# Patient Record
Sex: FEMALE | Race: WHITE | NOT HISPANIC OR LATINO | URBAN - METROPOLITAN AREA
[De-identification: names, ages, dates, MRNs, and addresses within clinical notes are randomized per-mention and may not be internally consistent; named-entity substitution may affect disease eponyms.]

---

## 2018-12-02 ENCOUNTER — EMERGENCY (EMERGENCY)
Facility: HOSPITAL | Age: 83
LOS: 0 days | Discharge: HOME | End: 2018-12-02
Attending: EMERGENCY MEDICINE | Admitting: EMERGENCY MEDICINE

## 2018-12-02 VITALS
TEMPERATURE: 97 F | RESPIRATION RATE: 18 BRPM | DIASTOLIC BLOOD PRESSURE: 92 MMHG | WEIGHT: 179.9 LBS | OXYGEN SATURATION: 99 % | HEART RATE: 60 BPM | HEIGHT: 64 IN | SYSTOLIC BLOOD PRESSURE: 175 MMHG

## 2018-12-02 VITALS
OXYGEN SATURATION: 95 % | DIASTOLIC BLOOD PRESSURE: 70 MMHG | SYSTOLIC BLOOD PRESSURE: 136 MMHG | HEART RATE: 70 BPM | RESPIRATION RATE: 18 BRPM

## 2018-12-02 DIAGNOSIS — S09.90XA UNSPECIFIED INJURY OF HEAD, INITIAL ENCOUNTER: ICD-10-CM

## 2018-12-02 DIAGNOSIS — S70.11XA CONTUSION OF RIGHT THIGH, INITIAL ENCOUNTER: ICD-10-CM

## 2018-12-02 DIAGNOSIS — Z23 ENCOUNTER FOR IMMUNIZATION: ICD-10-CM

## 2018-12-02 DIAGNOSIS — S00.83XA CONTUSION OF OTHER PART OF HEAD, INITIAL ENCOUNTER: ICD-10-CM
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
## 2018-12-02 DIAGNOSIS — S80.02XA CONTUSION OF LEFT KNEE, INITIAL ENCOUNTER: ICD-10-CM
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
## 2018-12-02 DIAGNOSIS — M54.2 CERVICALGIA: ICD-10-CM

## 2018-12-02 DIAGNOSIS — H11.31 CONJUNCTIVAL HEMORRHAGE, RIGHT EYE: ICD-10-CM

## 2018-12-02 DIAGNOSIS — S40.021A CONTUSION OF RIGHT UPPER ARM, INITIAL ENCOUNTER: ICD-10-CM

## 2018-12-02 RX ORDER — TETANUS TOXOID, REDUCED DIPHTHERIA TOXOID AND ACELLULAR PERTUSSIS VACCINE, ADSORBED 5; 2.5; 8; 8; 2.5 [IU]/.5ML; [IU]/.5ML; UG/.5ML; UG/.5ML; UG/.5ML
0.5 SUSPENSION INTRAMUSCULAR ONCE
Qty: 0 | Refills: 0 | Status: COMPLETED | OUTPATIENT
Start: 2018-12-02 | End: 2018-12-02

## 2018-12-02 RX ORDER — ACETAMINOPHEN 500 MG
975 TABLET ORAL ONCE
Qty: 0 | Refills: 0 | Status: COMPLETED | OUTPATIENT
Start: 2018-12-02 | End: 2018-12-02

## 2018-12-02 RX ADMIN — Medication 975 MILLIGRAM(S): at 11:01

## 2018-12-02 RX ADMIN — TETANUS TOXOID, REDUCED DIPHTHERIA TOXOID AND ACELLULAR PERTUSSIS VACCINE, ADSORBED 0.5 MILLILITER(S): 5; 2.5; 8; 8; 2.5 SUSPENSION INTRAMUSCULAR at 11:01

## 2018-12-02 NOTE — ED PROVIDER NOTE - ATTENDING CONTRIBUTION TO CARE
I personally evaluated the patient. I reviewed the Resident’s or Physician Assistant’s note (as assigned above), and agree with the findings and plan except as documented in my note. 87yo F BIBA from home after a mechanical fall this morning. Pt is on Plavix. No LOC, HA, nausea, neck pain, palpitations, Vomiting, visual changes, CP, SOB, abdominal pain, back pain, (+) RUE pain but denies any other extremity pain. Pt with knee replacement on the right. Pt denies any hip pain.  She denies any posterior cervical neck pain.  On exam: NCAT. PERRLA, EOMI. OP clear. Lungs CTAB. RRR, S1S2 noted. Radial pulses 2+ and equal, pedal pulses 2+ and equal. Abdomen soft, NT/ND, no rebound or guarding. FROM x4 extremities. No focal neuro deficits.  Right eye with subconjunctival hemorrhage, Bruising to the right arm and right face. VA intact, Plan: CT head orbits, neck, XR of the RUE and pelvis, continue to monitor.

## 2018-12-02 NOTE — ED PROVIDER NOTE - MEDICAL DECISION MAKING DETAILS
I personally evaluated the patient. I reviewed the Resident’s or Physician Assistant’s note (as assigned above), and agree with the findings and plan except as documented in my note. 87yo F BIBA from home after a mechanical fall this morning. Pt is on Plavix. No LOC, HA, nausea, neck pain, palpitations, Vomiting, visual changes, CP, SOB, abdominal pain, back pain, (+) RUE pain but denies any other extremity pain. Pt with knee replacement on the right. Pt denies any hip pain. On exam: NCAT. PERRLA, EOMI. OP clear. Lungs CTAB. RRR, S1S2 noted. Radial pulses 2+ and equal, pedal pulses 2+ and equal. Abdomen soft, NT/ND, no rebound or guarding. FROM x4 extremities. No focal neuro deficits.  Right eye with subconjunctival hemorrhage, Bruising to the right arm and right face. VA intact, Plan: CT head orbits, neck, XR of the RUE and pelvis, continue to monitor. I personally evaluated the patient. I reviewed the Resident’s or Physician Assistant’s note (as assigned above), and agree with the findings and plan except as documented in my note. 85yo F BIBA from home after a mechanical fall this morning. Pt is on Plavix. No LOC, HA, nausea, neck pain, palpitations, Vomiting, visual changes, CP, SOB, abdominal pain, back pain, (+) RUE pain but denies any other extremity pain. Pt with knee replacement on the right. Pt denies any hip pain.  She denies any posterior cervical neck pain.  On exam: NCAT. PERRLA, EOMI. OP clear. Lungs CTAB. RRR, S1S2 noted. Radial pulses 2+ and equal, pedal pulses 2+ and equal. Abdomen soft, NT/ND, no rebound or guarding. FROM x4 extremities. No focal neuro deficits.  Right eye with subconjunctival hemorrhage, Bruising to the right arm and right face. VA intact, Plan: CT head orbits, neck, XR of the RUE and pelvis, continue to monitor.

## 2018-12-02 NOTE — ED PROVIDER NOTE - OBJECTIVE STATEMENT
patient Moroccan speaking, daughter as translater  87 yo F here s/p fall. Patient was coming down stairs, missed a step and fell 1 step and fell  injuring face, neck, right arm, right leg and left knee. +bleeding to face. No loc, HA, n/v or change in mental status/vison. No chest pains abdominal pains. Last tetanus unknown.

## 2018-12-02 NOTE — ED ADULT NURSE REASSESSMENT NOTE - NS ED NURSE REASSESS COMMENT FT1
Pt does not know the names of all her medications and does not the reasons that she takes the medications

## 2018-12-02 NOTE — ED PROVIDER NOTE - PROGRESS NOTE DETAILS
Patient given copies of results for outpatient f/u. Patient able to ambulate without pain at baseline discharged with family

## 2018-12-02 NOTE — ED ADULT TRIAGE NOTE - CHIEF COMPLAINT QUOTE
Pt fell down 1 step and landed face down on a tile floor. No LOC Pt has swelling to R eyeand abrasion to R side of nose

## 2018-12-02 NOTE — ED PROVIDER NOTE - NSFOLLOWUPINSTRUCTIONS_ED_ALL_ED_FT
Follow up with your PMD and opthalmology in 2-3 days.     Patient to be discharged from ED. Any available test results were discussed with patient and/or family. Verbal instructions given, including instructions to return to ED immediately for any new, worsening, or concerning symptoms. Patient endorsed understanding. Written discharge instructions additionally given, including follow-up plan.

## 2018-12-02 NOTE — ED PROVIDER NOTE - CARE PROVIDER_API CALL
your PMD 2 days,   Phone: (   )    -  Fax: (   )    -    Joby Adorno (MD), Ophthalmology  [facility] [place] [address]  Phone: [phone]  Fax: [phone]

## 2018-12-02 NOTE — ED PROVIDER NOTE - NS ED ROS FT
Review of Systems    Constitutional: (-) fever  Eyes/ENT: (-) blurry vision, (-) epistaxis  Cardiovascular: (-) chest pain, (-) syncope  Respiratory: (-) cough, (-) shortness of breath  Gastrointestinal: (-) vomiting, (-) diarrhea  Musculoskeletal: (+) neck pain, (-) back pain, (+) right face pain, (+) pain to extremities  Integumentary: (-) rash, (-) edema, (+) bleeding to face  Neurological: (-) headache, (-) altered mental status  Psychiatric: (-) hallucinations  Allergic/Immunologic: (-) pruritus

## 2018-12-02 NOTE — ED PROVIDER NOTE - CARE PLAN
Principal Discharge DX:	Head injury  Secondary Diagnosis:	Subconjunctival hemorrhage of right eye  Secondary Diagnosis:	Contusion

## 2018-12-02 NOTE — ED PROVIDER NOTE - PHYSICAL EXAMINATION
Gen: Alert, NAD, well appearing  Head: NC, +swelling right periorbital area.  right eye: pupil irregular (hx of prior sx). +subconjunctival hemorrhage. Fluorescin stain negative for abrasion/Stew sign. EOMI. able to count fingers 5 feet away. Left eye: WNL   ENT: normal hearing, patent oropharynx  Neck: +supple, +tenderness to right side of neck  Pulm: Bilateral BS, normal resp effort, no wheeze/stridor/retractions  CV: S1S2, RRR  Abd: soft, NT/ND  Mskel: +tender to palpation over right upper arm, right leg, and left knee. FROM x4. No back tenderness. n/v intact. No edema/erythema/cyanosis  Skin: +abrasion to right cheek area. Ecchymosis to right face, right upper arm, right thigh and left leg. No rash, warm/dry.   Neuro: AAOx3, no sensory/motor deficits Gen: Alert, NAD, well appearing  Head: NC, +swelling right periorbital area.  right eye: pupil irregular (hx of prior sx). +subconjunctival hemorrhage. Fluorescin stain negative for abrasion/Stew sign. EOMI. able to count fingers 5 feet away. Left eye: WNL   ENT: normal hearing, patent oropharynx  Neck: +supple, +tenderness to right side of neck  Pulm: Bilateral BS, normal resp effort, no wheeze/stridor/retractions  CV: S1S2, RRR  Abd: soft, NT/ND  Mskel: +tender to palpation over right upper arm, right leg, and left knee. FROM x4. No back tenderness. n/v intact. No edema/erythema/cyanosis  Skin: +abrasion to right cheek area. Ecchymosis to right face, right upper arm, right thigh, right shin and left knee. No rash, warm/dry.   Neuro: AAOx3, no sensory/motor deficits Gen: Alert, NAD, well appearing  Head: NC, +swelling right periorbital area.  right eye: pupil irregular (hx of prior sx). +subconjunctival hemorrhage. Fluorescin stain negative for abrasion/Stew sign. EOMI. able to count fingers 5 feet away. Unable to visual accuity with eye chart since does not know alphabet.  Left eye: WNL   ENT: normal hearing, patent oropharynx  Neck: +supple, +tenderness to right side of neck  Pulm: Bilateral BS, normal resp effort, no wheeze/stridor/retractions  CV: S1S2, RRR  Abd: soft, NT/ND  Mskel: +tender to palpation over right upper arm, right leg, and left knee. FROM x4. No back tenderness. n/v intact. No edema/erythema/cyanosis  Skin: +abrasion to right cheek area. Ecchymosis to right face, right upper arm, right thigh, right shin and left knee. No rash, warm/dry.   Neuro: AAOx3, no sensory/motor deficits

## 2019-07-22 PROBLEM — I10 ESSENTIAL (PRIMARY) HYPERTENSION: Chronic | Status: ACTIVE | Noted: 2018-12-02

## 2019-07-23 PROBLEM — Z00.00 ENCOUNTER FOR PREVENTIVE HEALTH EXAMINATION: Status: ACTIVE | Noted: 2019-07-23

## 2019-08-26 ENCOUNTER — APPOINTMENT (OUTPATIENT)
Dept: NEUROSURGERY | Facility: CLINIC | Age: 84
End: 2019-08-26
Payer: MEDICARE

## 2019-08-26 VITALS — WEIGHT: 182 LBS | BODY MASS INDEX: 29.25 KG/M2 | HEIGHT: 66 IN

## 2019-08-26 DIAGNOSIS — Z78.9 OTHER SPECIFIED HEALTH STATUS: ICD-10-CM

## 2019-08-26 DIAGNOSIS — I10 ESSENTIAL (PRIMARY) HYPERTENSION: ICD-10-CM

## 2019-08-26 PROCEDURE — 99204 OFFICE O/P NEW MOD 45 MIN: CPT

## 2019-08-26 RX ORDER — LOSARTAN POTASSIUM 50 MG/1
50 TABLET, FILM COATED ORAL
Refills: 0 | Status: ACTIVE | COMMUNITY

## 2019-08-26 RX ORDER — OMEGA-3/DHA/EPA/FISH OIL 300-1000MG
CAPSULE ORAL
Refills: 0 | Status: ACTIVE | COMMUNITY

## 2019-08-26 RX ORDER — ISOSORBIDE DINITRATE 30 MG/1
30 TABLET ORAL
Refills: 0 | Status: ACTIVE | COMMUNITY

## 2019-08-26 RX ORDER — CLONIDINE HYDROCHLORIDE 0.1 MG/1
0.1 TABLET ORAL
Refills: 0 | Status: ACTIVE | COMMUNITY

## 2019-08-26 RX ORDER — CARVEDILOL 25 MG/1
25 TABLET, FILM COATED ORAL
Refills: 0 | Status: ACTIVE | COMMUNITY

## 2019-08-26 RX ORDER — GABAPENTIN 600 MG/1
600 TABLET, COATED ORAL
Refills: 0 | Status: ACTIVE | COMMUNITY

## 2019-08-26 RX ORDER — ATORVASTATIN CALCIUM 80 MG/1
TABLET, FILM COATED ORAL
Refills: 0 | Status: ACTIVE | COMMUNITY

## 2019-08-28 ENCOUNTER — FORM ENCOUNTER (OUTPATIENT)
Age: 84
End: 2019-08-28

## 2019-08-29 ENCOUNTER — OUTPATIENT (OUTPATIENT)
Dept: OUTPATIENT SERVICES | Facility: HOSPITAL | Age: 84
LOS: 1 days | Discharge: HOME | End: 2019-08-29
Payer: MEDICARE

## 2019-08-29 VITALS
OXYGEN SATURATION: 96 % | HEIGHT: 63 IN | SYSTOLIC BLOOD PRESSURE: 140 MMHG | HEART RATE: 60 BPM | RESPIRATION RATE: 17 BRPM | DIASTOLIC BLOOD PRESSURE: 60 MMHG | TEMPERATURE: 96 F

## 2019-08-29 DIAGNOSIS — Z98.890 OTHER SPECIFIED POSTPROCEDURAL STATES: Chronic | ICD-10-CM

## 2019-08-29 DIAGNOSIS — Z01.818 ENCOUNTER FOR OTHER PREPROCEDURAL EXAMINATION: ICD-10-CM

## 2019-08-29 DIAGNOSIS — M54.16 RADICULOPATHY, LUMBAR REGION: ICD-10-CM

## 2019-08-29 LAB
ALBUMIN SERPL ELPH-MCNC: 4.4 G/DL — SIGNIFICANT CHANGE UP (ref 3.5–5.2)
ALP SERPL-CCNC: 47 U/L — SIGNIFICANT CHANGE UP (ref 30–115)
ALT FLD-CCNC: 13 U/L — SIGNIFICANT CHANGE UP (ref 0–41)
ANION GAP SERPL CALC-SCNC: 11 MMOL/L — SIGNIFICANT CHANGE UP (ref 7–14)
APPEARANCE UR: CLEAR — SIGNIFICANT CHANGE UP
APTT BLD: 32.8 SEC — SIGNIFICANT CHANGE UP (ref 27–39.2)
AST SERPL-CCNC: 24 U/L — SIGNIFICANT CHANGE UP (ref 0–41)
BASOPHILS # BLD AUTO: 0.01 K/UL — SIGNIFICANT CHANGE UP (ref 0–0.2)
BASOPHILS NFR BLD AUTO: 0.2 % — SIGNIFICANT CHANGE UP (ref 0–1)
BILIRUB SERPL-MCNC: 0.7 MG/DL — SIGNIFICANT CHANGE UP (ref 0.2–1.2)
BILIRUB UR-MCNC: NEGATIVE — SIGNIFICANT CHANGE UP
BUN SERPL-MCNC: 30 MG/DL — HIGH (ref 10–20)
CALCIUM SERPL-MCNC: 9.5 MG/DL — SIGNIFICANT CHANGE UP (ref 8.5–10.1)
CHLORIDE SERPL-SCNC: 104 MMOL/L — SIGNIFICANT CHANGE UP (ref 98–110)
CO2 SERPL-SCNC: 29 MMOL/L — SIGNIFICANT CHANGE UP (ref 17–32)
COLOR SPEC: YELLOW — SIGNIFICANT CHANGE UP
CREAT SERPL-MCNC: 1 MG/DL — SIGNIFICANT CHANGE UP (ref 0.7–1.5)
DIFF PNL FLD: NEGATIVE — SIGNIFICANT CHANGE UP
EOSINOPHIL # BLD AUTO: 0.02 K/UL — SIGNIFICANT CHANGE UP (ref 0–0.7)
EOSINOPHIL NFR BLD AUTO: 0.4 % — SIGNIFICANT CHANGE UP (ref 0–8)
GLUCOSE SERPL-MCNC: 200 MG/DL — HIGH (ref 70–99)
GLUCOSE UR QL: NEGATIVE — SIGNIFICANT CHANGE UP
HCT VFR BLD CALC: 31.9 % — LOW (ref 37–47)
HGB BLD-MCNC: 10.2 G/DL — LOW (ref 12–16)
IMM GRANULOCYTES NFR BLD AUTO: 0.2 % — SIGNIFICANT CHANGE UP (ref 0.1–0.3)
INR BLD: 0.98 RATIO — SIGNIFICANT CHANGE UP (ref 0.65–1.3)
KETONES UR-MCNC: NEGATIVE — SIGNIFICANT CHANGE UP
LEUKOCYTE ESTERASE UR-ACNC: NEGATIVE — SIGNIFICANT CHANGE UP
LYMPHOCYTES # BLD AUTO: 1.32 K/UL — SIGNIFICANT CHANGE UP (ref 1.2–3.4)
LYMPHOCYTES # BLD AUTO: 27.6 % — SIGNIFICANT CHANGE UP (ref 20.5–51.1)
MCHC RBC-ENTMCNC: 28.7 PG — SIGNIFICANT CHANGE UP (ref 27–31)
MCHC RBC-ENTMCNC: 32 G/DL — SIGNIFICANT CHANGE UP (ref 32–37)
MCV RBC AUTO: 89.6 FL — SIGNIFICANT CHANGE UP (ref 81–99)
MONOCYTES # BLD AUTO: 0.37 K/UL — SIGNIFICANT CHANGE UP (ref 0.1–0.6)
MONOCYTES NFR BLD AUTO: 7.7 % — SIGNIFICANT CHANGE UP (ref 1.7–9.3)
NEUTROPHILS # BLD AUTO: 3.05 K/UL — SIGNIFICANT CHANGE UP (ref 1.4–6.5)
NEUTROPHILS NFR BLD AUTO: 63.9 % — SIGNIFICANT CHANGE UP (ref 42.2–75.2)
NITRITE UR-MCNC: NEGATIVE — SIGNIFICANT CHANGE UP
NRBC # BLD: 0 /100 WBCS — SIGNIFICANT CHANGE UP (ref 0–0)
PH UR: 6 — SIGNIFICANT CHANGE UP (ref 5–8)
PLATELET # BLD AUTO: 137 K/UL — SIGNIFICANT CHANGE UP (ref 130–400)
POTASSIUM SERPL-MCNC: 4.2 MMOL/L — SIGNIFICANT CHANGE UP (ref 3.5–5)
POTASSIUM SERPL-SCNC: 4.2 MMOL/L — SIGNIFICANT CHANGE UP (ref 3.5–5)
PROT SERPL-MCNC: 6.1 G/DL — SIGNIFICANT CHANGE UP (ref 6–8)
PROT UR-MCNC: SIGNIFICANT CHANGE UP
PROTHROM AB SERPL-ACNC: 11.3 SEC — SIGNIFICANT CHANGE UP (ref 9.95–12.87)
RBC # BLD: 3.56 M/UL — LOW (ref 4.2–5.4)
RBC # FLD: 12.9 % — SIGNIFICANT CHANGE UP (ref 11.5–14.5)
SODIUM SERPL-SCNC: 144 MMOL/L — SIGNIFICANT CHANGE UP (ref 135–146)
SP GR SPEC: 1.03 — HIGH (ref 1.01–1.02)
UROBILINOGEN FLD QL: SIGNIFICANT CHANGE UP
WBC # BLD: 4.78 K/UL — LOW (ref 4.8–10.8)
WBC # FLD AUTO: 4.78 K/UL — LOW (ref 4.8–10.8)

## 2019-08-29 PROCEDURE — 93010 ELECTROCARDIOGRAM REPORT: CPT

## 2019-08-29 PROCEDURE — 71046 X-RAY EXAM CHEST 2 VIEWS: CPT | Mod: 26

## 2019-08-29 NOTE — H&P PST ADULT - HISTORY OF PRESENT ILLNESS
86 year old female here for above, pt states previous history of lumbar spine surgery, was doing well for several years, approx 6 months ago + low back pain with pain down left leg. Pt tried PT, pain management, and also injection without relief, pt had trial of spinal cord stimulator with good effect, now to have permanent placement  pt denies cp, sob, redd or palpitations  pt denies urinary frequency, urgency or burning  ex stephen does not do stairs lives in elevator apartment building

## 2019-08-29 NOTE — H&P PST ADULT - EXTREMITIES COMMENTS
BLE, feet up to calves, pt takes diuretic (furosemide) only twice per week, advised to contact PMD. NP called MD office and sent note

## 2019-08-29 NOTE — H&P PST ADULT - LANGUAGE ASSISTANCE NEEDED
No-Patient/Caregiver offered and refused free interpretation services. No-Patient/Caregiver offered and refused free interpretation services./had daughter on phone and also home attendant

## 2019-08-29 NOTE — H&P PST ADULT - PRO PAIN LIFE ADAPT
decreased activity level/inability to enjoy life/withdrawal from social contact/inability or reluctance to perform ADLs

## 2019-09-05 RX ORDER — CLOPIDOGREL BISULFATE 75 MG/1
0 TABLET, FILM COATED ORAL
Qty: 0 | Refills: 0 | DISCHARGE

## 2019-09-09 NOTE — HISTORY OF PRESENT ILLNESS
[de-identified] : This is a justine 87 yo F with h/o severe degenerative scoliosis and lumbar fusion who underwent trial of SCS with pain management after failing extensive conservative treatment including PT, injections. She cont to have pain in the back radiating into the legs. She had more than 65% relief of both back and leg pain with here SCS trial. She would like to proceed with permanent implantation.

## 2019-09-09 NOTE — PLAN
[FreeTextEntry1] : I discussed at length with her the risk, benefit and alternative of permanent percutaneous SCS placement, including but not limited to bleeding, infection, spinal cord injury/nerve damage, CSF leak, lead migration, hardware failure, lack of pain relief, need for further procedures. All questions were answered and she wishes to proceed. Recent RI to be obtained for further review.

## 2019-09-20 ENCOUNTER — OUTPATIENT (OUTPATIENT)
Dept: OUTPATIENT SERVICES | Facility: HOSPITAL | Age: 84
LOS: 1 days | Discharge: HOME | End: 2019-09-20
Payer: MEDICARE

## 2019-09-20 VITALS
SYSTOLIC BLOOD PRESSURE: 124 MMHG | RESPIRATION RATE: 18 BRPM | DIASTOLIC BLOOD PRESSURE: 71 MMHG | HEIGHT: 63 IN | TEMPERATURE: 98 F | WEIGHT: 179.9 LBS | HEART RATE: 68 BPM

## 2019-09-20 VITALS
OXYGEN SATURATION: 98 % | HEART RATE: 80 BPM | DIASTOLIC BLOOD PRESSURE: 70 MMHG | SYSTOLIC BLOOD PRESSURE: 126 MMHG | RESPIRATION RATE: 15 BRPM

## 2019-09-20 DIAGNOSIS — Z98.890 OTHER SPECIFIED POSTPROCEDURAL STATES: Chronic | ICD-10-CM

## 2019-09-20 PROCEDURE — 63650 IMPLANT NEUROELECTRODES: CPT

## 2019-09-20 PROCEDURE — 63685 INS/RPLC SPI NPG/RCVR POCKET: CPT | Mod: AS

## 2019-09-20 PROCEDURE — 63685 INS/RPLC SPI NPG/RCVR POCKET: CPT

## 2019-09-20 RX ORDER — HYDROMORPHONE HYDROCHLORIDE 2 MG/ML
0.5 INJECTION INTRAMUSCULAR; INTRAVENOUS; SUBCUTANEOUS
Refills: 0 | Status: DISCONTINUED | OUTPATIENT
Start: 2019-09-20 | End: 2019-09-20

## 2019-09-20 RX ORDER — CARVEDILOL PHOSPHATE 80 MG/1
1 CAPSULE, EXTENDED RELEASE ORAL
Qty: 0 | Refills: 0 | DISCHARGE

## 2019-09-20 RX ORDER — TRAMADOL HYDROCHLORIDE 50 MG/1
0.5 TABLET ORAL
Qty: 14 | Refills: 0
Start: 2019-09-20

## 2019-09-20 RX ORDER — SODIUM CHLORIDE 9 MG/ML
1000 INJECTION, SOLUTION INTRAVENOUS
Refills: 0 | Status: DISCONTINUED | OUTPATIENT
Start: 2019-09-20 | End: 2019-09-20

## 2019-09-20 RX ORDER — HYDROMORPHONE HYDROCHLORIDE 2 MG/ML
0.25 INJECTION INTRAMUSCULAR; INTRAVENOUS; SUBCUTANEOUS
Refills: 0 | Status: DISCONTINUED | OUTPATIENT
Start: 2019-09-20 | End: 2019-09-20

## 2019-09-20 RX ORDER — OXYCODONE AND ACETAMINOPHEN 5; 325 MG/1; MG/1
2 TABLET ORAL ONCE
Refills: 0 | Status: DISCONTINUED | OUTPATIENT
Start: 2019-09-20 | End: 2019-09-20

## 2019-09-20 RX ORDER — ONDANSETRON 8 MG/1
4 TABLET, FILM COATED ORAL ONCE
Refills: 0 | Status: DISCONTINUED | OUTPATIENT
Start: 2019-09-20 | End: 2019-09-20

## 2019-09-20 RX ADMIN — HYDROMORPHONE HYDROCHLORIDE 0.5 MILLIGRAM(S): 2 INJECTION INTRAMUSCULAR; INTRAVENOUS; SUBCUTANEOUS at 11:15

## 2019-09-20 RX ADMIN — HYDROMORPHONE HYDROCHLORIDE 0.5 MILLIGRAM(S): 2 INJECTION INTRAMUSCULAR; INTRAVENOUS; SUBCUTANEOUS at 12:21

## 2019-09-20 RX ADMIN — HYDROMORPHONE HYDROCHLORIDE 0.5 MILLIGRAM(S): 2 INJECTION INTRAMUSCULAR; INTRAVENOUS; SUBCUTANEOUS at 13:30

## 2019-09-20 RX ADMIN — HYDROMORPHONE HYDROCHLORIDE 0.5 MILLIGRAM(S): 2 INJECTION INTRAMUSCULAR; INTRAVENOUS; SUBCUTANEOUS at 11:37

## 2019-09-20 RX ADMIN — HYDROMORPHONE HYDROCHLORIDE 0.5 MILLIGRAM(S): 2 INJECTION INTRAMUSCULAR; INTRAVENOUS; SUBCUTANEOUS at 12:06

## 2019-09-20 RX ADMIN — HYDROMORPHONE HYDROCHLORIDE 0.5 MILLIGRAM(S): 2 INJECTION INTRAMUSCULAR; INTRAVENOUS; SUBCUTANEOUS at 13:55

## 2019-09-20 RX ADMIN — SODIUM CHLORIDE 100 MILLILITER(S): 9 INJECTION, SOLUTION INTRAVENOUS at 10:37

## 2019-09-20 RX ADMIN — HYDROMORPHONE HYDROCHLORIDE 0.5 MILLIGRAM(S): 2 INJECTION INTRAMUSCULAR; INTRAVENOUS; SUBCUTANEOUS at 15:50

## 2019-09-20 NOTE — ASU DISCHARGE PLAN (ADULT/PEDIATRIC) - CARE PROVIDER_API CALL
Uriel Larios)  [facility]  [address] [place] [address], [address]  [place] [address]  Phone: [phone]  Fax: [phone]  Follow Up Time:

## 2019-09-20 NOTE — ASU DISCHARGE PLAN (ADULT/PEDIATRIC) - CALL YOUR DOCTOR IF YOU HAVE ANY OF THE FOLLOWING:
Bleeding that does not stop/Fever greater than (need to indicate Fahrenheit or Celsius)/Wound/Surgical Site with redness, or foul smelling discharge or pus/Swelling that gets worse/Pain not relieved by Medications

## 2019-09-20 NOTE — CHART NOTE - NSCHARTNOTEFT_GEN_A_CORE
PACU ANESTHESIA ADMISSION NOTE      Procedure:   Post op diagnosis:      ____  Intubated  TV:______       Rate: ______      FiO2: ______    _x___  Patent Airway    _x___  Full return of protective reflexes    __x__  Full recovery from anesthesia / back to baseline     Vitals:   T: 97.7          R: 10                 BP: 157/92                 Sat: 100                  P: 80      Mental Status:  _x___ Awake   _x____ Alert   _____ Drowsy   _____ Sedated    Nausea/Vomiting:  __x__ NO  ______Yes,   See Post - Op Orders          Pain Scale (0-10):  _0____    Treatment: ____ None    __x__ See Post - Op/PCA Orders    Post - Operative Fluids:   ____ Oral   _x___ See Post - Op Orders    Plan: Discharge:   x____Home       _____Floor     _____Critical Care    _____  Other:__alert and awake moving all four extremities _______________    Comments:

## 2019-09-20 NOTE — BRIEF OPERATIVE NOTE - OPERATION/FINDINGS
Percutaneous electrode placed to bottom of T8, high degree difficulty of placement due scoliosis stenosis

## 2019-10-01 DIAGNOSIS — M41.9 SCOLIOSIS, UNSPECIFIED: ICD-10-CM

## 2019-10-01 DIAGNOSIS — M47.26 OTHER SPONDYLOSIS WITH RADICULOPATHY, LUMBAR REGION: ICD-10-CM

## 2019-10-01 DIAGNOSIS — M48.061 SPINAL STENOSIS, LUMBAR REGION WITHOUT NEUROGENIC CLAUDICATION: ICD-10-CM

## 2019-10-01 DIAGNOSIS — I10 ESSENTIAL (PRIMARY) HYPERTENSION: ICD-10-CM

## 2019-10-09 ENCOUNTER — APPOINTMENT (OUTPATIENT)
Dept: NEUROSURGERY | Facility: CLINIC | Age: 84
End: 2019-10-09
Payer: MEDICARE

## 2019-10-09 VITALS — HEIGHT: 64 IN | WEIGHT: 170 LBS | BODY MASS INDEX: 29.02 KG/M2

## 2019-10-09 PROCEDURE — 99215 OFFICE O/P EST HI 40 MIN: CPT

## 2019-10-17 NOTE — REASON FOR VISIT
[FreeTextEntry1] : s/p percutaneous implantation of Nevro device. Incisional pain mild post op. incisions healing well. Sutures removed. stable pre op pain. Device to be turned on and programmed today in office.

## 2019-10-17 NOTE — PHYSICAL EXAM
[FreeTextEntry1] : Constitutional: Well appearing, no distress\par HEENT: Normocephalic Atraumatic\par Psychiatric: Alert and oriented to all spheres, normal mood\par Pulmonary: no respiratory distress\par Abdomen: non-distended\par Vascular/Extremities: no edema, no cyanosis, no clubbing\par \par \par Neurologic: \par CN II-XII grossly intact\par ROM: decreased in LS spine due to pain \par Palpation: no pain to palpation in cervical spine, no pain to palpation in lumbar spine\par Strength: 4/5 BLE pain limited\par Sensation: Full sensation to light touch in all extremities\par Reflexes: \par  2+ patellar\par  2+ biceps\par  2+ ankle jerk\par  No Campbell's\par  No clonus\par  No babinski\par \par Signs:\par SLR negative\par L'hermitte's negative\par \par Constitutional: Well appearing, no distress\par HEENT: Normocephalic Atraumatic\par Psychiatric: Alert and oriented to all spheres, normal mood\par Pulmonary: no respiratory distress\par Abdomen: non-distended\par Vascular/Extremities: no edema, no cyanosis, no clubbing\par \par \par Neurologic: \par CN II-XII grossly intact\par ROM: decreased in LS spine due to pain \par Palpation: no pain to palpation in cervical spine, no pain to palpation in lumbar spine\par Strength: 4/5 BLE pain limited\par Sensation: Full sensation to light touch in all extremities\par Reflexes: \par  2+ patellar\par  2+ biceps\par  2+ ankle jerk\par  No Campbell's\par  No clonus\par  No babinski\par \par Signs:\par SLR negative\par L'hermitte's negative\par

## 2019-11-11 ENCOUNTER — APPOINTMENT (OUTPATIENT)
Dept: NEUROSURGERY | Facility: CLINIC | Age: 84
End: 2019-11-11
Payer: MEDICARE

## 2019-11-11 VITALS — BODY MASS INDEX: 29.88 KG/M2 | WEIGHT: 175 LBS | HEIGHT: 64 IN

## 2019-11-11 DIAGNOSIS — M54.5 LOW BACK PAIN: ICD-10-CM

## 2019-11-11 DIAGNOSIS — M54.16 RADICULOPATHY, LUMBAR REGION: ICD-10-CM

## 2019-11-11 DIAGNOSIS — M96.1 POSTLAMINECTOMY SYNDROME, NOT ELSEWHERE CLASSIFIED: ICD-10-CM

## 2019-11-11 PROCEDURE — 99214 OFFICE O/P EST MOD 30 MIN: CPT

## 2019-11-19 PROBLEM — M54.5 LUMBAGO: Status: ACTIVE | Noted: 2019-09-09

## 2019-11-19 PROBLEM — M96.1 FAILED BACK SYNDROME: Status: ACTIVE | Noted: 2019-09-09

## 2019-11-19 PROBLEM — M54.16 CHRONIC LUMBAR RADICULOPATHY: Status: ACTIVE | Noted: 2019-09-09

## 2019-11-19 NOTE — PHYSICAL EXAM
[FreeTextEntry1] : Constitutional: Well appearing, no distress\par HEENT: Normocephalic Atraumatic\par Psychiatric: Alert and oriented to all spheres, normal mood\par Pulmonary: no respiratory distress\par Abdomen: non-distended\par Vascular/Extremities: no edema, no cyanosis, no clubbing\par \par \par Neurologic: \par CN II-XII grossly intact\par ROM: decreased in LS spine due to pain \par Palpation: no pain to palpation in cervical spine, no pain to palpation in lumbar spine\par Strength: 4/5 BLE pain limited\par Sensation: Full sensation to light touch in all extremities\par Reflexes: \par  2+ patellar\par  2+ biceps\par  2+ ankle jerk\par  No Campbell's\par  No clonus\par  No babinski\par \par Signs:\par SLR negative\par L'hermitte's negative\par \par Gait: antalgic, uses walker \par \par incisions have healed nicely

## 2019-11-19 NOTE — HISTORY OF PRESENT ILLNESS
[FreeTextEntry1] : Ms. Tavera is s/p percutaneous SCS placement with Nevro. She is having very good pain relief and is very happy with her device thus far. She will cont to follow up at dr. Hughes office for further care and programming of her stimulator as needed.

## 2020-01-20 NOTE — ASU PREOP CHECKLIST - IDENTIFICATION BAND VERIFIED
Problem: Dysphagia (Adult)  Goal: *Acute Goals and Plan of Care (Insert Text)  Description  Speech Therapy Goals  Initiated 1/20/2020    1. Patient will tolerate regular diet/thin liquids without adverse effects within 7 days. Outcome: Progressing Towards Goal     Problem: Communication Impaired (Adult)  Goal: *Acute Goals and Plan of Care (Insert Text)  Description  Speech Therapy Goals  Initiated 1/20/2020    1. Patient will answer complex yes/no questions with 80% accuracy within 7 days. 2. Patient will complete modified melodic intonation therapy tasks with 80% accuracy with mod cues within 7 days. 3. Patient will complete sentence completion tasks with 80% accuracy within 7 days. 4. Patient will complete repetition tasks with 80% accuracy within 7 days. Outcome: Progressing Towards Goal     SPEECH LANGUAGE PATHOLOGY BEDSIDE SWALLOW AND LANGUAGE EVALUATION  Patient: Santi Hernández (89 y.o. female)  Date: 1/20/2020  Primary Diagnosis: Stroke (cerebrum) [facility]) [I63.9]        Precautions:  Fall    ASSESSMENT :  Based on the objective data described below, the patient presents with mild pharyngeal dysphagia characterized by overt s/s of aspiration with large, sequential sips of thin liquids. Otherwise, pt with functional swallow. Therefore, recommend pt initiate regular diet/thin liquids. Furthermore, pt presents with mild receptive and moderate non-fluent expressive aphasia most consistent with a Broca's aphasia on this date. Pt noted to have anomia as well as verbal and phonemic paraphasias. Will continue to benefit from further SLP intervention at this and the next level of care. Should it align with PT and OT recs, pt would be an excellent candidate for inpatient rehab for language treatment. Patient will benefit from skilled intervention to address the above impairments.   Patients rehabilitation potential is considered to be Good     PLAN :  Recommendations and Planned Interventions:  --Regular diet/thin liquids  --Small, single sips of liquids  --fully upright    Frequency/Duration: Patient will be followed by speech-language pathology 3 times a week to address goals. Discharge Recommendations: Inpatient Rehab     SUBJECTIVE:   Patient stated, \"He can be. .con..con.. descend. Candance Huger Candance Huger . OBJECTIVE:     Past Medical History:   Diagnosis Date    Breast CA ([facility] [address]     Fluid retention in legs     Hypertension     Hypothyroidism      Past Surgical History:   Procedure Laterality Date    BREAST SURGERY PROCEDURE UNLISTED      Left lumpectomy    HX ORTHOPAEDIC  2007    left hip replacement     Prior Level of Function/Home Situation:   Home Situation  Home Environment: Private residence  # Steps to Enter: 10  Rails to Enter: Yes  Hand Rails : Bilateral(too wide to hold both)  One/Two Story Residence: One story  Living Alone: No  Support Systems: Spouse/Significant Other/Partner, Family member(s)  Patient Expects to be Discharged to[de-identified] Private residence  Current DME Used/Available at Home: Dietra Hives, straight, Walker, rolling, Shower chair  Tub or Shower Type: Tub/Shower combination  Diet prior to admission: Regular diet/thin liquids  Current Diet:  NPO until seen by SLP  Cognitive and Communication Status:  Neurologic State: Alert  Orientation Level: Unable to verbalize  Cognition: Unable to assess (comment)(2/2 aphasia)  Perception: Appears intact  Perseveration: No perseveration noted  Safety/Judgement: Insight into deficits  Oral Assessment:  Oral Assessment  Labial: Right droop  Dentition: Intact  Oral Hygiene: oral mucosa moist and clear of secretions  Lingual: No impairment  Velum: No impairment  Mandible: No impairment  P.O. Trials:  Patient Position: upright in bed  Vocal quality prior to P.O.: No impairment  Consistency Presented: Thin liquid; Solid  How Presented: Self-fed/presented;Cup/sip;Straw;Successive swallows;Spoon  Bolus Acceptance: No impairment  Bolus Formation/Control: No impairment     Propulsion: No impairment  Oral Residue: None  Initiation of Swallow: Delayed (# of seconds)  Laryngeal Elevation: Functional  Aspiration Signs/Symptoms: Strong cough(strong cough x1 with sequential sips)    Motor Speech:  Oral-Motor Structure/Motor Speech  Labial: Right droop  Dentition: Intact  Oral Hygiene: oral mucosa moist and clear of secretions  Lingual: No impairment  Velum: No impairment  Mandible: No impairment  Language Comprehension and Expression:  Auditory Comprehension  Auditory Impairment: Yes  Response to Basic Yes/No Questions (%): 100 %  Response to Complex Yes/No Questions (%) : 50 %  One-Step Basic Commands (%): 100 %  Three-Step Basic Commands (%): 50 %  Interfering Components: (Receptive aphasia)  Verbal Expression  Primary Mode of Expression: Verbal  Initiation: Impaired (%)  Automatic Speech Task: Impaired (comment)  Automatic speech task cueing type: Phonemic;Verbal  Automatic speech task cueing amount: Mod-max  Repetition: Impaired  Word Repetition (%): 100 %  Phrase Repetition (%): 50 %  Naming: Impaired  Confrontation (%): (Phonemic paraphasias present)  Responsive (%): (Anomia and phonemic paraphasias)  Naming cueing type: Verbal  Conversation: Non-fluent  Speech Characteristics: Paraphasias; Word retrieval       Oral Phase Severity: No impairment  Pharyngeal Phase Severity : Mild    NOMS:   The NOMS functional outcome measure was used to quantify this patient's level of swallowing impairment. Based on the NOMS, the patient was determined to be at level 6 for swallow function       NOMS Swallowing Levels:  Level 1 (CN): NPO  Level 2 (CM): NPO but takes consistency in therapy  Level 3 (CL): Takes less than 50% of nutrition p.o. and continues with nonoral feedings; and/or safe with mod cues; and/or max diet restriction  Level 4 (CK):  Safe swallow but needs mod cues; and/or mod diet restriction; and/or still requires some nonoral feeding/supplements  Level 5 (CJ): Safe swallow with min diet restriction; and/or needs min cues  Level 6 (CI): Independent with p.o.; rare cues; usually self cues; may need to avoid some foods or needs extra time  Level 7 ([address] [place] [address]): Independent for all p.o.  GIAN. (2003). National Outcomes Measurement System (NOMS): Adult Speech-Language Pathology User's Guide. The NOMS functional outcome measure was used to quantify this patient's level of expressive language impairment. Based on the NOMS, the patient was determined to be at level 3 for spoken language expression. NOMS Spoken Language Expression:  Level 1 (CN): Verbalizations not meaningful to anyone. Level 2 (CM): Few attempts accurate/appropriate. Max cues to elicit automatic/imitative words/phrases. Level 3 (CL): Communication partner responsible for communication; Mod cues for words/phrases meaningful in context  Level 4 (CK): Initiate during simple, routine activities w/familiar partner. Mod cues to produce simple sentences  Level 5 ([place]): Initiates communication with familiar and unfamiliar partners. Min cues for complex sentences. Level 6 (CI): Communicates for most activities. Some limitations still present for vocational/social activities. Rarely cued for complex info  Level 7 [facility]): Independent communication. GIAN. (2003). National Outcomes Measurement System (NOMS): Adult Speech-Language Pathology User's Guide. Pain:  Pain Scale 1: Numeric (0 - 10)  Pain Intensity 1: 0       After treatment:   Nursing notified    COMMUNICATION/EDUCATION:   Patient was educated regarding her deficit(s) of aphasia and dysphagia as this relates to her diagnosis of stroke. She demonstrated Good understanding. The patient's plan of care including recommendations, planned interventions, and recommended diet changes were discussed with: Physical Therapist, Occupational Therapist, and Registered Nurse. Patient/family have participated as able in goal setting and plan of care.     Thank you for this referral.  Jeffery Dueñas, SLP  Time Calculation: 23 mins done

## 2022-03-02 ENCOUNTER — APPOINTMENT (OUTPATIENT)
Dept: OPHTHALMOLOGY | Facility: CLINIC | Age: 87
End: 2022-03-02
Payer: MEDICARE

## 2022-03-02 ENCOUNTER — NON-APPOINTMENT (OUTPATIENT)
Age: 87
End: 2022-03-02

## 2022-03-02 PROCEDURE — 92133 CPTRZD OPH DX IMG PST SGM ON: CPT

## 2022-03-02 PROCEDURE — 92002 INTRM OPH EXAM NEW PATIENT: CPT

## 2022-03-02 PROCEDURE — 92083 EXTENDED VISUAL FIELD XM: CPT

## 2022-03-11 ENCOUNTER — NON-APPOINTMENT (OUTPATIENT)
Age: 87
End: 2022-03-11

## 2022-03-11 ENCOUNTER — APPOINTMENT (OUTPATIENT)
Dept: OPHTHALMOLOGY | Facility: CLINIC | Age: 87
End: 2022-03-11
Payer: MEDICARE

## 2022-03-11 PROCEDURE — 99442: CPT | Mod: 95

## 2022-03-15 NOTE — ASU PATIENT PROFILE, ADULT - FALL HARM RISK - HARM RISK INTERVENTIONS

## 2022-03-15 NOTE — ASU PATIENT PROFILE, ADULT - NSICDXPASTSURGICALHX_GEN_ALL_CORE_FT
PAST SURGICAL HISTORY:  H/O knee surgery     H/O shoulder surgery     H/O: hysterectomy     Personal history of spine surgery     S/P colonoscopy     S/P lumpectomy, right breast

## 2022-03-15 NOTE — ASU PATIENT PROFILE, ADULT - NSICDXPASTMEDICALHX_GEN_ALL_CORE_FT
PAST MEDICAL HISTORY:  Anxiety     CAD (coronary artery disease)     Chronic renal insufficiency     H/O degenerative disc disease     HTN (hypertension)     Hyperlipidemia     RADHA (obstructive sleep apnea)     Osteoarthritis      PAST MEDICAL HISTORY:  Anxiety     CAD (coronary artery disease)     Chronic renal insufficiency     H/O degenerative disc disease     HTN (hypertension)     Hyperlipidemia     RADHA (obstructive sleep apnea)     RADHA (obstructive sleep apnea)     Osteoarthritis

## 2022-03-16 ENCOUNTER — APPOINTMENT (OUTPATIENT)
Dept: OPHTHALMOLOGY | Facility: AMBULATORY SURGERY CENTER | Age: 87
End: 2022-03-16

## 2022-03-16 ENCOUNTER — NON-APPOINTMENT (OUTPATIENT)
Age: 87
End: 2022-03-16

## 2022-03-16 ENCOUNTER — OUTPATIENT (OUTPATIENT)
Dept: OUTPATIENT SERVICES | Facility: HOSPITAL | Age: 87
LOS: 1 days | Discharge: HOME | End: 2022-03-16
Payer: MEDICARE

## 2022-03-16 VITALS
DIASTOLIC BLOOD PRESSURE: 60 MMHG | HEIGHT: 63 IN | HEART RATE: 59 BPM | SYSTOLIC BLOOD PRESSURE: 155 MMHG | TEMPERATURE: 98 F | OXYGEN SATURATION: 96 % | RESPIRATION RATE: 16 BRPM | WEIGHT: 168.65 LBS

## 2022-03-16 VITALS
RESPIRATION RATE: 16 BRPM | TEMPERATURE: 98 F | DIASTOLIC BLOOD PRESSURE: 50 MMHG | SYSTOLIC BLOOD PRESSURE: 127 MMHG | OXYGEN SATURATION: 96 % | HEART RATE: 59 BPM

## 2022-03-16 DIAGNOSIS — Z98.890 OTHER SPECIFIED POSTPROCEDURAL STATES: Chronic | ICD-10-CM

## 2022-03-16 DIAGNOSIS — Z90.710 ACQUIRED ABSENCE OF BOTH CERVIX AND UTERUS: Chronic | ICD-10-CM

## 2022-03-16 PROCEDURE — 66170 GLAUCOMA SURGERY: CPT | Mod: LT

## 2022-03-16 RX ORDER — ACETAMINOPHEN 500 MG
1000 TABLET ORAL ONCE
Refills: 0 | Status: DISCONTINUED | OUTPATIENT
Start: 2022-03-16 | End: 2022-03-16

## 2022-03-16 RX ORDER — SODIUM CHLORIDE 9 MG/ML
1000 INJECTION, SOLUTION INTRAVENOUS
Refills: 0 | Status: DISCONTINUED | OUTPATIENT
Start: 2022-03-16 | End: 2022-03-16

## 2022-03-16 RX ORDER — OFLOXACIN 0.3 %
1 DROPS OPHTHALMIC (EYE)
Refills: 0 | Status: COMPLETED | OUTPATIENT
Start: 2022-03-16 | End: 2022-03-16

## 2022-03-16 RX ORDER — ACETAMINOPHEN 500 MG
2 TABLET ORAL
Qty: 0 | Refills: 0 | DISCHARGE
Start: 2022-03-16

## 2022-03-16 RX ADMIN — Medication 1 DROP(S): at 12:49

## 2022-03-16 RX ADMIN — Medication 1 DROP(S): at 12:31

## 2022-03-16 RX ADMIN — Medication 1 DROP(S): at 12:41

## 2022-03-16 NOTE — OPERATIVE REPORT - OPERATIVE RPOSRT DETAILS
DATE OF SURGERY:    OPERATING SURGEON: PRAFUL RAMSEY D.O.     ASSISTANT SURGEON:     ANESTHESIA: MONITORED ANESTHESIA CARE    PREOPERATIVE DIAGNOSIS: GLAUCOMA, LEFT EYE    POSTOPERATIVE DIAGNOSIS: GLAUCOMA, LEFT EYE    OPERATIVE PROCEDURE: TRABECULECTOMY WITH MITOMYCIN C, LEFT EYE    COMPLICATIONS: NONE.     SPECIMENS: NONE.    ESTIMATED BLOOD LOSS: <1 cc    PATIENT CONDITION: STABLE.    PROCEDURE:  Prior to the procedure, all risks, benefits and alternatives were discussed with the patient, including but not limited to infection, bleeding, retinal detachment, increase or decrease in intraocular pressure, corneal edema, corneal decompensation, ptosis, diplopia, loss of vision, no improvement of vision, need for second surgery, macular edema, intraocular inflammation, etc. All questions were answered and the patient wished to proceed with the surgery. Informed consent was obtained.    The patient was wheeled to the operating room and placed on the operating table in a supine position. Next, the left eye was prepped and draped in the usual sterile fashion for intraocular surgery. An eyelid speculum was placed into the left eye.    A 7-0 silk traction suture was placed through the cornea and the eye was rotated inferiorly. Subconjunctival and subtenon lidocaine and mitomycin C 30 micrograms were then injected into the superior quadrant. A superior peritomy was then created with Vannas scissors and clot forceps. The area was carefully dissected posteriorly. A 4 x 2.5-mm scleral flap was created using a #75 blade and dissected half thickness to the limbus using a #57 blade.    Two 10-0 Nylon interrupted scleral flap sutures were pre-placed. #75 blade was used to create a temporal paracentesis. The scleral flap was grasped with .12 forceps and the anterior chamber was entered with a #75 blade. Next, a Gillian punch was used to remove a portion of trabecular meshwork.    Two preplaced 10-0 Nylon scleral flap sutures were tied for appropriate flow. One 10-0 Nylon scleral flap suture was added for appropriate flow. The conjunctiva and tenon fascia were then re-approximated to the limbus using three interrupted 10-0 Nylon horizontal mattress sutures. Balanced salt solution was injected into the anterior chamber on a 30 gauge cannula.     At the cessation of the procedure, the anterior chamber was well formed with a diffuse filtering bleb superiorly. All wounds were inspected meticulously and found to be watertight. Antibiotic and dexamethasone were applied on the eye. Eyelid speculum was removed. Topical antibiotic and steroid ointment was placed onto the left eye, which was then patched and shielded. The patient left the operating room in an excellent condition.       DATE OF SURGERY: 03/16/2022    OPERATING SURGEON: PRAFUL RAMSEY D.O.     ASSISTANT SURGEON: NONE     ANESTHESIA: MONITORED ANESTHESIA CARE    PREOPERATIVE DIAGNOSIS: MILD PSEUDOEXFOLIATION GLAUCOMA, LEFT EYE    POSTOPERATIVE DIAGNOSIS: MILD PSEUDOEXFOLIATION GLAUCOMA, LEFT EYE    OPERATIVE PROCEDURE: TRABECULECTOMY WITH MITOMYCIN C, LEFT EYE    COMPLICATIONS: NONE.     SPECIMENS: NONE.    ESTIMATED BLOOD LOSS: <1 cc    PATIENT CONDITION: STABLE.    PROCEDURE:  Prior to the procedure, all risks, benefits and alternatives were discussed with the patient, including but not limited to infection, bleeding, retinal detachment, increase or decrease in intraocular pressure, corneal edema, corneal decompensation, ptosis, diplopia, loss of vision, no improvement of vision, need for second surgery, macular edema, intraocular inflammation, etc. All questions were answered and the patient wished to proceed with the surgery. Informed consent was obtained.    The patient was wheeled to the operating room and placed on the operating table in a supine position. Next, the left eye was prepped and draped in the usual sterile fashion for intraocular surgery. An eyelid speculum was placed into the left eye.    A 7-0 silk traction suture was placed through the cornea and the eye was rotated inferiorly. Subconjunctival and subtenon lidocaine and mitomycin C 10 micrograms were then injected into the superior quadrant. A superior peritomy was then created with Vannas scissors and clot forceps. The area was carefully dissected posteriorly. A 4 x 2.5-mm scleral flap was created using an iknife and dissected half thickness to the limbus using a #57 blade. Conjunctival and scleral tissue was found to be very thin and delicate.     Two 10-0 Nylon interrupted scleral flap sutures were pre-placed. 1-mm Side port blade was used to create a temporal paracentesis. The scleral flap was grasped with .12 forceps and the anterior chamber was entered with the iknife. Next, a Gillian punch was used to remove a portion of trabecular meshwork. No iridectomy was done.     Two preplaced 10-0 Nylon scleral flap sutures were tied for appropriate flow. The conjunctiva and tenon fascia were then re-approximated to the limbus using three interrupted 10-0 Nylon horizontal mattress sutures. Balanced salt solution was injected into the anterior chamber on a 30 gauge cannula.     At the cessation of the procedure, the anterior chamber was well formed with a diffuse filtering bleb superiorly. All wounds were inspected meticulously and found to be watertight. Antibiotic and dexamethasone were applied on the eye. Eyelid speculum was removed. Topical antibiotic and steroid ointment was placed onto the left eye, which was then patched and shielded. The patient left the operating room in an excellent condition.

## 2022-03-16 NOTE — ASU DISCHARGE PLAN (ADULT/PEDIATRIC) - NS MD DC FALL RISK RISK
For information on Fall & Injury Prevention, visit: [email or web address] OR  [email or web address] OR  https://www.cdc.gov/steadi/patient.html

## 2022-03-17 ENCOUNTER — NON-APPOINTMENT (OUTPATIENT)
Age: 87
End: 2022-03-17

## 2022-03-17 ENCOUNTER — APPOINTMENT (OUTPATIENT)
Dept: OPHTHALMOLOGY | Facility: CLINIC | Age: 87
End: 2022-03-17
Payer: MEDICARE

## 2022-03-17 PROBLEM — G47.33 OBSTRUCTIVE SLEEP APNEA (ADULT) (PEDIATRIC): Chronic | Status: ACTIVE | Noted: 2022-03-16

## 2022-03-17 PROBLEM — M19.90 UNSPECIFIED OSTEOARTHRITIS, UNSPECIFIED SITE: Chronic | Status: ACTIVE | Noted: 2022-03-16

## 2022-03-17 PROBLEM — F41.9 ANXIETY DISORDER, UNSPECIFIED: Chronic | Status: ACTIVE | Noted: 2022-03-16

## 2022-03-17 PROBLEM — E78.5 HYPERLIPIDEMIA, UNSPECIFIED: Chronic | Status: ACTIVE | Noted: 2022-03-16

## 2022-03-17 PROBLEM — N18.9 CHRONIC KIDNEY DISEASE, UNSPECIFIED: Chronic | Status: ACTIVE | Noted: 2022-03-16

## 2022-03-17 PROBLEM — Z87.39 PERSONAL HISTORY OF OTHER DISEASES OF THE MUSCULOSKELETAL SYSTEM AND CONNECTIVE TISSUE: Chronic | Status: ACTIVE | Noted: 2022-03-16

## 2022-03-17 PROCEDURE — 99024 POSTOP FOLLOW-UP VISIT: CPT

## 2022-03-22 ENCOUNTER — NON-APPOINTMENT (OUTPATIENT)
Age: 87
End: 2022-03-22

## 2022-03-22 ENCOUNTER — APPOINTMENT (OUTPATIENT)
Dept: OPHTHALMOLOGY | Facility: CLINIC | Age: 87
End: 2022-03-22
Payer: MEDICARE

## 2022-03-22 PROCEDURE — 99024 POSTOP FOLLOW-UP VISIT: CPT

## 2022-03-28 ENCOUNTER — NON-APPOINTMENT (OUTPATIENT)
Age: 87
End: 2022-03-28

## 2022-03-28 ENCOUNTER — APPOINTMENT (OUTPATIENT)
Dept: OPHTHALMOLOGY | Facility: CLINIC | Age: 87
End: 2022-03-28
Payer: MEDICARE

## 2022-03-28 PROCEDURE — 99024 POSTOP FOLLOW-UP VISIT: CPT

## 2022-04-05 ENCOUNTER — APPOINTMENT (OUTPATIENT)
Dept: OPHTHALMOLOGY | Facility: CLINIC | Age: 87
End: 2022-04-05
Payer: MEDICARE

## 2022-04-05 ENCOUNTER — NON-APPOINTMENT (OUTPATIENT)
Age: 87
End: 2022-04-05

## 2022-04-05 PROCEDURE — 92134 CPTRZ OPH DX IMG PST SGM RTA: CPT

## 2022-04-05 PROCEDURE — 92014 COMPRE OPH EXAM EST PT 1/>: CPT | Mod: 24

## 2022-04-05 PROCEDURE — 99024 POSTOP FOLLOW-UP VISIT: CPT

## 2022-04-14 ENCOUNTER — APPOINTMENT (OUTPATIENT)
Dept: OPHTHALMOLOGY | Facility: CLINIC | Age: 87
End: 2022-04-14
Payer: MEDICARE

## 2022-04-14 ENCOUNTER — NON-APPOINTMENT (OUTPATIENT)
Age: 87
End: 2022-04-14

## 2022-04-14 PROCEDURE — 99024 POSTOP FOLLOW-UP VISIT: CPT

## 2022-04-14 PROCEDURE — 68200 TREAT EYELID BY INJECTION: CPT | Mod: 78,LT

## 2022-04-21 ENCOUNTER — NON-APPOINTMENT (OUTPATIENT)
Age: 87
End: 2022-04-21

## 2022-04-21 ENCOUNTER — APPOINTMENT (OUTPATIENT)
Dept: OPHTHALMOLOGY | Facility: CLINIC | Age: 87
End: 2022-04-21
Payer: MEDICARE

## 2022-04-21 PROCEDURE — 99024 POSTOP FOLLOW-UP VISIT: CPT

## 2022-05-03 ENCOUNTER — APPOINTMENT (OUTPATIENT)
Dept: OPHTHALMOLOGY | Facility: CLINIC | Age: 87
End: 2022-05-03
Payer: MEDICARE

## 2022-05-03 ENCOUNTER — NON-APPOINTMENT (OUTPATIENT)
Age: 87
End: 2022-05-03

## 2022-05-03 PROCEDURE — 92134 CPTRZ OPH DX IMG PST SGM RTA: CPT

## 2022-05-03 PROCEDURE — 92012 INTRM OPH EXAM EST PATIENT: CPT | Mod: 24

## 2022-05-12 ENCOUNTER — APPOINTMENT (OUTPATIENT)
Dept: OPHTHALMOLOGY | Facility: CLINIC | Age: 87
End: 2022-05-12
Payer: MEDICARE

## 2022-05-12 ENCOUNTER — NON-APPOINTMENT (OUTPATIENT)
Age: 87
End: 2022-05-12

## 2022-05-12 PROCEDURE — 99024 POSTOP FOLLOW-UP VISIT: CPT

## 2022-06-07 ENCOUNTER — NON-APPOINTMENT (OUTPATIENT)
Age: 87
End: 2022-06-07

## 2022-06-07 ENCOUNTER — APPOINTMENT (OUTPATIENT)
Dept: OPHTHALMOLOGY | Facility: CLINIC | Age: 87
End: 2022-06-07
Payer: MEDICARE

## 2022-06-07 PROCEDURE — 92012 INTRM OPH EXAM EST PATIENT: CPT | Mod: 24

## 2022-06-07 PROCEDURE — 92134 CPTRZ OPH DX IMG PST SGM RTA: CPT

## 2022-06-13 ENCOUNTER — APPOINTMENT (OUTPATIENT)
Dept: OPHTHALMOLOGY | Facility: CLINIC | Age: 87
End: 2022-06-13
Payer: MEDICARE

## 2022-06-13 ENCOUNTER — NON-APPOINTMENT (OUTPATIENT)
Age: 87
End: 2022-06-13

## 2022-06-13 PROCEDURE — 99024 POSTOP FOLLOW-UP VISIT: CPT

## 2022-06-30 ENCOUNTER — NON-APPOINTMENT (OUTPATIENT)
Age: 87
End: 2022-06-30

## 2022-06-30 ENCOUNTER — APPOINTMENT (OUTPATIENT)
Dept: OPHTHALMOLOGY | Facility: CLINIC | Age: 87
End: 2022-06-30

## 2022-06-30 PROCEDURE — 92012 INTRM OPH EXAM EST PATIENT: CPT | Mod: 25

## 2022-06-30 PROCEDURE — 68200 TREAT EYELID BY INJECTION: CPT | Mod: LT

## 2022-07-12 ENCOUNTER — NON-APPOINTMENT (OUTPATIENT)
Age: 87
End: 2022-07-12

## 2022-07-12 ENCOUNTER — APPOINTMENT (OUTPATIENT)
Dept: OPHTHALMOLOGY | Facility: CLINIC | Age: 87
End: 2022-07-12

## 2022-07-12 PROCEDURE — 92134 CPTRZ OPH DX IMG PST SGM RTA: CPT

## 2022-07-12 PROCEDURE — 92012 INTRM OPH EXAM EST PATIENT: CPT

## 2022-07-14 ENCOUNTER — NON-APPOINTMENT (OUTPATIENT)
Age: 87
End: 2022-07-14

## 2022-07-14 ENCOUNTER — APPOINTMENT (OUTPATIENT)
Dept: OPHTHALMOLOGY | Facility: CLINIC | Age: 87
End: 2022-07-14

## 2022-07-14 PROCEDURE — 66250 FOLLOW-UP SURGERY OF EYE: CPT | Mod: LT

## 2022-07-25 ENCOUNTER — APPOINTMENT (OUTPATIENT)
Dept: OPHTHALMOLOGY | Facility: CLINIC | Age: 87
End: 2022-07-25

## 2022-07-25 ENCOUNTER — NON-APPOINTMENT (OUTPATIENT)
Age: 87
End: 2022-07-25

## 2022-07-25 PROCEDURE — 99024 POSTOP FOLLOW-UP VISIT: CPT

## 2022-08-01 RX ORDER — OFLOXACIN 0.3 %
1 DROPS OPHTHALMIC (EYE)
Refills: 0 | Status: DISCONTINUED | OUTPATIENT
Start: 2022-08-02 | End: 2022-08-02

## 2022-08-01 NOTE — ASU PATIENT PROFILE, ADULT - NSICDXPASTSURGICALHX_GEN_ALL_CORE_FT
PAST SURGICAL HISTORY:  H/O knee surgery right    H/O shoulder surgery     H/O: hysterectomy     Personal history of spine surgery     S/P colonoscopy     S/P lumpectomy, right breast

## 2022-08-01 NOTE — ASU PATIENT PROFILE, ADULT - NS PREOP UNDERSTANDS INFO
leave valuables/jewelry/contacts at home, make sure to have escort 18+, bring photo ID, insurance card + covid vax card, no solid foods after midnight, water/apple juice/gatorade until 1045/yes

## 2022-08-01 NOTE — ASU PATIENT PROFILE, ADULT - NSICDXPASTMEDICALHX_GEN_ALL_CORE_FT
PAST MEDICAL HISTORY:  Anxiety     CAD (coronary artery disease) no stents    Chronic cough     Chronic renal insufficiency     Glaucoma     H/O degenerative disc disease     HTN (hypertension)     Hyperlipidemia     RADHA (obstructive sleep apnea)     RADHA (obstructive sleep apnea)     Osteoarthritis

## 2022-08-01 NOTE — ASU PATIENT PROFILE, ADULT - FALL HARM RISK - HARM RISK INTERVENTIONS
Assistance with ambulation/Assistance OOB with selected safe patient handling equipment/Communicate Risk of Fall with Harm to all staff/Discuss with provider need for PT consult/Monitor gait and stability/Provide patient with walking aids - walker, cane, crutches/Reinforce activity limits and safety measures with patient and family/Sit up slowly, dangle for a short time, stand at bedside before walking/Tailored Fall Risk Interventions/Visual Cue: Yellow wristband and red socks/Bed in lowest position, wheels locked, appropriate side rails in place/Call bell, personal items and telephone in reach/Instruct patient to call for assistance before getting out of bed or chair/Non-slip footwear when patient is out of bed/[place] to call system/Physically safe environment - no spills, clutter or unnecessary equipment/Purposeful Proactive Rounding/Room/bathroom lighting operational, light cord in reach

## 2022-08-02 ENCOUNTER — OUTPATIENT (OUTPATIENT)
Dept: OUTPATIENT SERVICES | Facility: HOSPITAL | Age: 87
LOS: 1 days | Discharge: HOME | End: 2022-08-02

## 2022-08-02 ENCOUNTER — TRANSCRIPTION ENCOUNTER (OUTPATIENT)
Age: 87
End: 2022-08-02

## 2022-08-02 ENCOUNTER — NON-APPOINTMENT (OUTPATIENT)
Age: 87
End: 2022-08-02

## 2022-08-02 ENCOUNTER — APPOINTMENT (OUTPATIENT)
Dept: OPHTHALMOLOGY | Facility: AMBULATORY SURGERY CENTER | Age: 87
End: 2022-08-02

## 2022-08-02 VITALS
DIASTOLIC BLOOD PRESSURE: 53 MMHG | OXYGEN SATURATION: 95 % | WEIGHT: 163.36 LBS | SYSTOLIC BLOOD PRESSURE: 98 MMHG | HEIGHT: 60 IN | TEMPERATURE: 97 F | HEART RATE: 61 BPM | RESPIRATION RATE: 16 BRPM

## 2022-08-02 VITALS
OXYGEN SATURATION: 96 % | SYSTOLIC BLOOD PRESSURE: 169 MMHG | DIASTOLIC BLOOD PRESSURE: 63 MMHG | HEART RATE: 57 BPM | RESPIRATION RATE: 16 BRPM | TEMPERATURE: 98 F

## 2022-08-02 DIAGNOSIS — Z98.890 OTHER SPECIFIED POSTPROCEDURAL STATES: Chronic | ICD-10-CM

## 2022-08-02 DIAGNOSIS — Z90.710 ACQUIRED ABSENCE OF BOTH CERVIX AND UTERUS: Chronic | ICD-10-CM

## 2022-08-02 PROCEDURE — 66180 AQUEOUS SHUNT EYE W/GRAFT: CPT | Mod: 58,LT

## 2022-08-02 DEVICE — SHUNT GLAUCOMA BAERVELDT: Type: IMPLANTABLE DEVICE | Site: LEFT | Status: FUNCTIONAL

## 2022-08-02 RX ORDER — ACETAMINOPHEN 500 MG
650 TABLET ORAL ONCE
Refills: 0 | Status: DISCONTINUED | OUTPATIENT
Start: 2022-08-02 | End: 2022-08-02

## 2022-08-02 RX ORDER — CARVEDILOL PHOSPHATE 80 MG/1
0 CAPSULE, EXTENDED RELEASE ORAL
Qty: 0 | Refills: 0 | DISCHARGE

## 2022-08-02 RX ORDER — LABETALOL HCL 100 MG
1 TABLET ORAL
Qty: 0 | Refills: 0 | DISCHARGE

## 2022-08-02 RX ORDER — HYDRALAZINE HCL 50 MG
0 TABLET ORAL
Qty: 0 | Refills: 0 | DISCHARGE

## 2022-08-02 RX ORDER — OMEGA-3 ACID ETHYL ESTERS 1 G
0 CAPSULE ORAL
Qty: 0 | Refills: 0 | DISCHARGE

## 2022-08-02 RX ORDER — SERTRALINE 25 MG/1
1 TABLET, FILM COATED ORAL
Qty: 0 | Refills: 0 | DISCHARGE

## 2022-08-02 RX ORDER — LINACLOTIDE 145 UG/1
1 CAPSULE, GELATIN COATED ORAL
Qty: 0 | Refills: 0 | DISCHARGE

## 2022-08-02 RX ORDER — LOSARTAN POTASSIUM 100 MG/1
0 TABLET, FILM COATED ORAL
Qty: 0 | Refills: 0 | DISCHARGE

## 2022-08-02 RX ORDER — OXYCODONE HYDROCHLORIDE 5 MG/1
5 TABLET ORAL ONCE
Refills: 0 | Status: DISCONTINUED | OUTPATIENT
Start: 2022-08-02 | End: 2022-08-02

## 2022-08-02 RX ORDER — RISEDRONATE SODIUM 25.8; 4.2 MG/1; MG/1
1 TABLET, FILM COATED ORAL
Qty: 0 | Refills: 0 | DISCHARGE

## 2022-08-02 RX ORDER — GABAPENTIN 400 MG/1
0 CAPSULE ORAL
Qty: 0 | Refills: 0 | DISCHARGE

## 2022-08-02 RX ORDER — ONDANSETRON 8 MG/1
4 TABLET, FILM COATED ORAL ONCE
Refills: 0 | Status: DISCONTINUED | OUTPATIENT
Start: 2022-08-02 | End: 2022-08-02

## 2022-08-02 RX ORDER — SACUBITRIL AND VALSARTAN 24; 26 MG/1; MG/1
0 TABLET, FILM COATED ORAL
Qty: 0 | Refills: 0 | DISCHARGE

## 2022-08-02 RX ORDER — FUROSEMIDE 40 MG
1 TABLET ORAL
Qty: 0 | Refills: 0 | DISCHARGE

## 2022-08-02 RX ORDER — CLOPIDOGREL BISULFATE 75 MG/1
1 TABLET, FILM COATED ORAL
Qty: 0 | Refills: 0 | DISCHARGE

## 2022-08-02 RX ORDER — DARIFENACIN HYDROBROMIDE 15 MG/1
1 TABLET, EXTENDED RELEASE ORAL
Qty: 0 | Refills: 0 | DISCHARGE

## 2022-08-02 RX ORDER — ATORVASTATIN CALCIUM 80 MG/1
1 TABLET, FILM COATED ORAL
Qty: 0 | Refills: 0 | DISCHARGE

## 2022-08-02 RX ORDER — NIFEDIPINE 30 MG
1 TABLET, EXTENDED RELEASE 24 HR ORAL
Qty: 0 | Refills: 0 | DISCHARGE

## 2022-08-02 RX ORDER — MAGNESIUM OXIDE 400 MG ORAL TABLET 241.3 MG
1 TABLET ORAL
Qty: 0 | Refills: 0 | DISCHARGE

## 2022-08-02 RX ADMIN — OXYCODONE HYDROCHLORIDE 5 MILLIGRAM(S): 5 TABLET ORAL at 13:28

## 2022-08-02 RX ADMIN — OXYCODONE HYDROCHLORIDE 5 MILLIGRAM(S): 5 TABLET ORAL at 12:58

## 2022-08-02 RX ADMIN — Medication 1 DROP(S): at 10:35

## 2022-08-02 NOTE — OPERATIVE REPORT - OPERATIVE RPOSRT DETAILS
DATE OF SURGERY:     OPERATING SURGEON: PRAFUL RAMSEY D.O.     ASSISTANT SURGEON:     ANESTHESIA: MONITORED ANESTHESIA CARE AND SUBCONJUNCTIVAL INJECTION.    PREOPERATIVE DIAGNOSIS: GLAUCOMA, LEFT EYE    POSTOPERATIVE DIAGNOSIS: GLAUCOMA, LEFT EYE    OPERATIVE PROCEDURE: BAERVELDT GLAUCOMA IMPLANT, CORNEAL PATCH GRAFT, LEFT EYE.    COMPLICATIONS: NONE.    SPECIMEN: NONE.    ESTIMATED BLOOD LOSS: <1 cc    PATIENT CONDITION: STABLE.    PROCEDURE:   Prior to the procedure, all risks, benefits and alternatives were discussed with the patient, including but not limited to infection, bleeding, retinal detachment, increase or decrease in intraocular pressure, corneal edema, corneal decompensation, ptosis, diplopia, loss of vision, no improvement of vision, need for second surgery, macular edema, intraocular inflammation, etc. All questions were answered and the patient wished to proceed with the surgery. Informed consent was obtained.    The patient was wheeled to the operating room and placed on the operating table in a supine position. Next, the left eye was prepped and draped in the usual sterile fashion for intraocular surgery. An eyelid speculum was placed into the left eye.    A 7-0 silk traction suture was placed through the cornea and the eye was rotated superiorly. Subconjunctival and subtenon lidocaine was then injected into the inferonasal quadrant. An inferonasal peritomy was then created with Alex scissors and clot forceps. The area was carefully dissected posteriorly. A Baerveldt glaucoma implant 350 was primed with balanced salt solution. One 3-0 Prolene suture and one 6-0 Prolene suture were inserted as rip cords into the proximal opening of the tube. The tube was ligated tightly together with the two rip cord sutures, using two 7-0 Vicryl sutures. Inferior and nasal rectus muscles were isolated using a muscle hook and a cotton-tipped applicator. The plate was then placed 8 mm posterior to the limbus and sutured down with two interrupted 8-0 Nylon sutures.    The tube was then trimmed to an appropriate length. Using a 23 gauge needle, a sclerostomy was created with an external opening at 2 mm posterior to the limbus. The tube was then placed into the anterior chamber through the sclerostomy. It was noted to be in a good position. The tube was then sutured to the sclera using three interrupted 9-0 Nylon sutures. A corneal patch graft was sutured over the tube with two interrupted 8-0 Vicryl sutures. A 10-0 Vicryl suture was passed across the tube for intraocular pressure control during the early postoperative period. After appropriate conjunctivoplasty, the conjunctiva was then reapproximated with interrupted 8-0 Vicryl sutures. Mitomycin C 10 micrograms was injected into the Tenon’s capsule over the tube plate.    At the end of the procedure, the anterior chamber was well formed. The intraocular pressure was in the mid-teens by palpation. The tube was in a good position and the conjunctival wound was water tight. Antibiotic and dexamethasone were applied on the cornea and conjunctiva. The eyelid speculum was removed. Topical antibiotic and steroid ointment was placed onto the left eye, which was then patched and shielded. The patient was wheeled to the recovery room in a stable and excellent condition.   DATE OF SURGERY: 08/02/2022    OPERATING SURGEON: PRAFUL RAMSEY D.O.     ASSISTANT SURGEON: WHITNEY SOTELO M.D.    ANESTHESIA: MONITORED ANESTHESIA CARE AND SUBCONJUNCTIVAL INJECTION.    PREOPERATIVE DIAGNOSIS: MILD PSEUDOEXFOLIATION GLAUCOMA, LEFT EYE    POSTOPERATIVE DIAGNOSIS: MILD PSEUDOEXFOLIATION GLAUCOMA, LEFT EYE    OPERATIVE PROCEDURE: BAERVELDT GLAUCOMA IMPLANT, CORNEAL PATCH GRAFT, LEFT EYE.    COMPLICATIONS: NONE.    SPECIMEN: NONE.    ESTIMATED BLOOD LOSS: 1 cc    PATIENT CONDITION: STABLE.    PROCEDURE:   Prior to the procedure, all risks, benefits and alternatives were discussed with the patient, including but not limited to infection, bleeding, retinal detachment, increase or decrease in intraocular pressure, corneal edema, corneal decompensation, ptosis, diplopia, loss of vision, no improvement of vision, need for second surgery, macular edema, intraocular inflammation, etc. All questions were answered and the patient wished to proceed with the surgery. Informed consent was obtained.    The patient was wheeled to the operating room and placed on the operating table in a supine position. Next, the left eye was prepped and draped in the usual sterile fashion for intraocular surgery. An eyelid speculum was placed into the left eye.    A 7-0 silk traction suture was placed through the cornea and the eye was rotated superiorly. Subconjunctival and subtenon lidocaine was then injected into the inferonasal quadrant. An inferonasal peritomy was then created with Alex scissors and clot forceps. The area was carefully dissected posteriorly. A Baerveldt glaucoma implant 350 was primed with balanced salt solution. One 3-0 Prolene suture and one 6-0 Prolene suture were inserted as rip cords into the proximal opening of the tube. The tube was ligated tightly together with the two rip cord sutures, using two 7-0 Vicryl sutures. Inferior and nasal rectus muscles were isolated using a muscle hook and a cotton-tipped applicator. The plate was then placed 8 mm posterior to the limbus and sutured down with two interrupted 8-0 Nylon sutures.    The tube was then trimmed to an appropriate length. Using a 23 gauge needle, a sclerostomy was created with an external opening at 2 mm posterior to the limbus. The tube was then placed into the anterior chamber through the sclerostomy. It was noted to be in a good position. The tube was then sutured to the sclera using two interrupted 10-0 Nylon sutures. A corneal patch graft was sutured over the tube with two interrupted 8-0 Vicryl sutures. After appropriate conjunctivoplasty, the conjunctiva was then reapproximated with interrupted 8-0 Vicryl sutures.     At the end of the procedure, the anterior chamber was well formed. The intraocular pressure was in the mid-teens by palpation. The tube was in a good position and the conjunctival wound was water tight. Antibiotic and dexamethasone were applied on the cornea and conjunctiva. The eyelid speculum was removed. Topical antibiotic and steroid ointment was placed onto the left eye, which was then patched and shielded. The patient was wheeled to the recovery room in a stable and excellent condition.

## 2022-08-03 ENCOUNTER — NON-APPOINTMENT (OUTPATIENT)
Age: 87
End: 2022-08-03

## 2022-08-03 ENCOUNTER — APPOINTMENT (OUTPATIENT)
Dept: OPHTHALMOLOGY | Facility: CLINIC | Age: 87
End: 2022-08-03

## 2022-08-03 PROBLEM — R05.3 CHRONIC COUGH: Chronic | Status: ACTIVE | Noted: 2022-08-02

## 2022-08-03 PROBLEM — H40.9 UNSPECIFIED GLAUCOMA: Chronic | Status: ACTIVE | Noted: 2022-08-02

## 2022-08-03 PROBLEM — I25.10 ATHEROSCLEROTIC HEART DISEASE OF NATIVE CORONARY ARTERY WITHOUT ANGINA PECTORIS: Chronic | Status: ACTIVE | Noted: 2022-03-16

## 2022-08-03 PROCEDURE — 99024 POSTOP FOLLOW-UP VISIT: CPT

## 2022-08-08 ENCOUNTER — NON-APPOINTMENT (OUTPATIENT)
Age: 87
End: 2022-08-08

## 2022-08-08 ENCOUNTER — APPOINTMENT (OUTPATIENT)
Dept: OPHTHALMOLOGY | Facility: CLINIC | Age: 87
End: 2022-08-08

## 2022-08-08 PROCEDURE — 99024 POSTOP FOLLOW-UP VISIT: CPT

## 2022-08-15 ENCOUNTER — APPOINTMENT (OUTPATIENT)
Dept: OPHTHALMOLOGY | Facility: CLINIC | Age: 87
End: 2022-08-15

## 2022-08-15 ENCOUNTER — NON-APPOINTMENT (OUTPATIENT)
Age: 87
End: 2022-08-15

## 2022-08-15 PROCEDURE — 99024 POSTOP FOLLOW-UP VISIT: CPT

## 2022-08-16 ENCOUNTER — APPOINTMENT (OUTPATIENT)
Dept: OPHTHALMOLOGY | Facility: CLINIC | Age: 87
End: 2022-08-16

## 2022-08-22 ENCOUNTER — NON-APPOINTMENT (OUTPATIENT)
Age: 87
End: 2022-08-22

## 2022-08-22 ENCOUNTER — APPOINTMENT (OUTPATIENT)
Dept: OPHTHALMOLOGY | Facility: CLINIC | Age: 87
End: 2022-08-22

## 2022-08-22 PROCEDURE — 99024 POSTOP FOLLOW-UP VISIT: CPT

## 2022-09-12 ENCOUNTER — APPOINTMENT (OUTPATIENT)
Dept: OPHTHALMOLOGY | Facility: CLINIC | Age: 87
End: 2022-09-12

## 2022-09-12 ENCOUNTER — NON-APPOINTMENT (OUTPATIENT)
Age: 87
End: 2022-09-12

## 2022-09-12 PROCEDURE — 99024 POSTOP FOLLOW-UP VISIT: CPT

## 2022-10-17 ENCOUNTER — APPOINTMENT (OUTPATIENT)
Dept: OPHTHALMOLOGY | Facility: CLINIC | Age: 87
End: 2022-10-17

## 2022-11-28 ENCOUNTER — APPOINTMENT (OUTPATIENT)
Dept: OPHTHALMOLOGY | Facility: CLINIC | Age: 87
End: 2022-11-28

## 2022-11-28 ENCOUNTER — NON-APPOINTMENT (OUTPATIENT)
Age: 87
End: 2022-11-28

## 2022-11-28 PROCEDURE — 92012 INTRM OPH EXAM EST PATIENT: CPT

## 2022-11-28 PROCEDURE — 92134 CPTRZ OPH DX IMG PST SGM RTA: CPT

## 2022-12-04 NOTE — ASU PATIENT PROFILE, ADULT - NPO AFTER
[FreeTextEntry1] : 85 yo M with hydrocele\par \par - Reviewed scrotal US imaging through [facility] portal and confirmed findings as stated above\par - Discussed treatment options for hydrocele and indications for intervention\par - Given asymptomatic, would recommend observation\par - FU as needed 23:59

## 2023-01-26 ENCOUNTER — APPOINTMENT (OUTPATIENT)
Dept: OPHTHALMOLOGY | Facility: CLINIC | Age: 88
End: 2023-01-26
Payer: MEDICARE

## 2023-01-26 ENCOUNTER — NON-APPOINTMENT (OUTPATIENT)
Age: 88
End: 2023-01-26

## 2023-01-26 PROCEDURE — 92012 INTRM OPH EXAM EST PATIENT: CPT

## 2023-02-10 ENCOUNTER — APPOINTMENT (OUTPATIENT)
Dept: OPHTHALMOLOGY | Facility: CLINIC | Age: 88
End: 2023-02-10
Payer: MEDICARE

## 2023-02-10 ENCOUNTER — NON-APPOINTMENT (OUTPATIENT)
Age: 88
End: 2023-02-10

## 2023-02-10 PROCEDURE — 92012 INTRM OPH EXAM EST PATIENT: CPT

## 2023-03-13 ENCOUNTER — NON-APPOINTMENT (OUTPATIENT)
Age: 88
End: 2023-03-13

## 2023-03-13 ENCOUNTER — APPOINTMENT (OUTPATIENT)
Dept: OPHTHALMOLOGY | Facility: CLINIC | Age: 88
End: 2023-03-13
Payer: MEDICARE

## 2023-03-13 PROCEDURE — 92012 INTRM OPH EXAM EST PATIENT: CPT

## 2023-03-13 PROCEDURE — 92083 EXTENDED VISUAL FIELD XM: CPT

## 2023-06-16 ENCOUNTER — APPOINTMENT (OUTPATIENT)
Dept: OPHTHALMOLOGY | Facility: CLINIC | Age: 88
End: 2023-06-16

## 2024-05-01 NOTE — ED ADULT NURSE NOTE - PMH
Appointment Request From: Natalie Gale      With Provider: Inessa Smith DO [[facility]]      Preferred Date Range: Any      Preferred Times: Any Time      Reason for visit: Request an Appointment      Comments:   Pains in right side and blood work follow up      HTN (hypertension)

## (undated) DEVICE — SYR LUER LOK 1CC

## (undated) DEVICE — SUT ETHILON 10-0 12" CS160-6

## (undated) DEVICE — PACK ANTERIOR SEGMENT

## (undated) DEVICE — SUT ETHILON 10-0 5" BV75-3

## (undated) DEVICE — WARMING BLANKET LOWER ADULT

## (undated) DEVICE — GLV 6.5 PROTEXIS (WHITE)

## (undated) DEVICE — ELCTR ERASER BI-P BVL 45DEG 18G

## (undated) DEVICE — KNIFE FULL HANDLE ANGLE 2.75MM

## (undated) DEVICE — SUT NYLON 9-0 12" CU-5

## (undated) DEVICE — SUT VICRYL 8-0 5" BV130-5

## (undated) DEVICE — KNIFE ALCON MVR V-LANCE 20G (WHITE)

## (undated) DEVICE — SUT SILK 7-0 18" TG140-8

## (undated) DEVICE — KNIFE ALCON PARACENTESIS CLEARCUT SIDEPORT 1MM (YELLOW)

## (undated) DEVICE — ELCTR BIPOLAR CORD J&J 12FT DISP

## (undated) DEVICE — KNIFE ALCON CRESCENT ANGLED BEVEL UP 2.3MM (PINK)

## (undated) DEVICE — SPEAR CELLULOSE 40410

## (undated) DEVICE — SHIELD CORNL CLLGN 12HR

## (undated) DEVICE — CANNULA IRR ANT CHAMBER 30G

## (undated) DEVICE — APPLICATOR COTTON TIP 3" STERILE

## (undated) DEVICE — NDL FILTER TW NOKOR 19GA 1.5"

## (undated) DEVICE — DRAPE MICROSCOPE KNOB COVER SMALL (2 PCS)

## (undated) DEVICE — SUT SILK 6-0 18" TG140-8

## (undated) DEVICE — NDL HYPO REGULAR BEVEL 30G X .5"

## (undated) DEVICE — VENODYNE/SCD SLEEVE CALF MEDIUM